# Patient Record
(demographics unavailable — no encounter records)

---

## 2025-05-18 NOTE — HISTORY OF PRESENT ILLNESS
[FreeTextEntry1] : 76 yo male with h/o CAD s/p PCI (11/2023), DM2, HTN, HLD, prostate ca, pancreatic polyps here for follow up cardiac care. Denies any significant chest discomfort, palpitations, dyspnea, LE edema, PND or syncope. Reports dyspnea with stairs and strenuous activities; stable however. Has resumed plavix since GI workup completed - likely pancreatic polyps as per patient - plan for repeat US as per GI Last stent placed in 11/2023; completed cardiac rehab. He is compliant with meds and CPAP. Was recommended for ozempic for A1C >6.5%  Has b/l hyperpigmentation over both LEs. Notes numbness in his legs and feet at night or with prolonged standing. Edema - stable in both LEs   Cardiac Workup: DSE 2/2019: nl lv sys fxn; lerma dysfxn; LVH; mild AI; no ischemia at peak heart rate achieved with dobutamine infusion. TTE 10/2023: nl lv sys fxn; nl lerma fxn; LVH; mod AS Carotid Duplex 10/2023: mild disease b/l CTA 10/2023: patent LAD stent extending to mid LAD/D2; CX/OM1 calcified; severe RCA/RPL Cardiac Cath 11/2023: patent prox LAD stent, D1 40-50%; distal LAD with disease and patent stent (iFR 0.90); 80% OM2 and mild CX; mild RCA; s/p successful PCI of OM2 TTE 07/2024: normal LV size/function. LVEF 68%, mild LVH, normal RV, LA mildly dilated, MV annulus calcified, Mod AS (MG 28, Vmax 3.3, SHAILESH 1.19, SVI 42), asc ao 3.9cm

## 2025-05-18 NOTE — PHYSICAL EXAM
[Well Developed] : well developed [Well Nourished] : well nourished [No Acute Distress] : no acute distress [Normal Conjunctiva] : normal conjunctiva [Normal Venous Pressure] : normal venous pressure [No Carotid Bruit] : no carotid bruit [Normal S1, S2] : normal S1, S2 [No Murmur] : no murmur [No Rub] : no rub [No Gallop] : no gallop [Clear Lung Fields] : clear lung fields [Good Air Entry] : good air entry [No Respiratory Distress] : no respiratory distress  [Soft] : abdomen soft [Non Tender] : non-tender [No Masses/organomegaly] : no masses/organomegaly [Normal Bowel Sounds] : normal bowel sounds [Normal Gait] : normal gait [No Cyanosis] : no cyanosis [No Clubbing] : no clubbing [Venous stasis] : venous stasis [No Rash] : no rash [No Skin Lesions] : no skin lesions [Moves all extremities] : moves all extremities [No Focal Deficits] : no focal deficits [Normal Speech] : normal speech [Alert and Oriented] : alert and oriented [Normal memory] : normal memory [de-identified] : hyperpigmentation over both LEs with trace edema over the anterior lower legs

## 2025-05-18 NOTE — ASSESSMENT
[FreeTextEntry1] : 76 yo male with h/o CAD s/p PCI, DM2, HTN, HLD, prostate ca, pancreatic polyps here for follow up cardiac care.  #ASCVD - CAD s/p PCI in 11/2023 - EKG sinus with 1st deg AVB, unchanged from prior - denies any ischemic symptoms at baseline; although has poor functional capacity - holding DAPT 5-7 days pre-procedure; rec to resume immediately post procedure - continued risk factor modification; CV risk factors discussed - continue beta blocker; may benefit from switching dilitazem to alternate anti-HTN given LE edema  #Dyspnea on exertion, LE edema - venous stasis, CEAP C4 --BNP WNL; no overt CHF --repeat TTE unchanged - normal LVEF, mod AS --continue lasix as needed -- He suffers from hyperpigmentation as a result of their lymphedema 2/2 chronic venous insufficiency. He has tried prescription compression socks at 20-30mmHg, simple exercises and elevation in the past (since 06/1/2024) without significant improvement. --will benefit from lymphedema pump --will obtain venous reflux study  --measurements for pump: LLE MIDDLE THIGH : 21.5 in , mid calf 18.5 in, ankle 11.5in . RLE MIDDLE THIGH 22.5 in, mid calf 19 in, ankle 11.75 in    # Hypertension - borderline elevated today; did not take diuretic - continue current anti-hypertensives; inc ARB, BB, and lasix - home BP monitoring encouraged - continued exercise as tolerated - Low salt diet  # Hyperlipidemia - Well controlled, Aim for a LDL of <70 - Continue current prescribed medications inc zetia and statin - Low fat low cholesterol diet - Heart healthy diet emphasized - Exercise as tolerated  Follow up 6-12 months

## 2025-06-20 NOTE — HISTORY OF PRESENT ILLNESS
[FreeTextEntry1] : 79 yo male with h/o CAD s/p PCI (11/2023), DM2, HTN, HLD, HFpEF, severe aortic stenosis, prostate ca, pancreatic polyps here for follow up cardiac care.  Had weight gain about 16 pounds, b/l LE edema and dyspnea from 5/12/24 - 6/9/25. Today reports that symptoms are improving, decreased LE edema, improving dyspnea and weight is now 307 lbs (was 316). States that he has continued to produce a lot of urine Has not used the edema pump since last visit.  Has been fluid restricting and stopped adding salt to his food Denies any significant chest discomfort, palpitations, dizziness or syncope. Denies chest pain, lightheadedness/dizziness or LOC with exertion Has resumed plavix since GI workup completed - likely pancreatic polyps as per patient - plan for repeat US as per GI Last stent placed in 11/2023; completed cardiac rehab. He is compliant with meds and CPAP. Was recommended for ozempic for A1C >6.5%   Cardiac Workup: DSE 2/2019: nl lv sys fxn; lerma dysfxn; LVH; mild AI; no ischemia at peak heart rate achieved with dobutamine infusion. TTE 10/2023: nl lv sys fxn; nl lerma fxn; LVH; mod AS Carotid Duplex 10/2023: mild disease b/l CTA 10/2023: patent LAD stent extending to mid LAD/D2; CX/OM1 calcified; severe RCA/RPL Cardiac Cath 11/2023: patent prox LAD stent, D1 40-50%; distal LAD with disease and patent stent (iFR 0.90); 80% OM2 and mild CX; mild RCA; s/p successful PCI of OM2 TTE 07/2024: normal LV size/function. LVEF 68%, mild LVH, normal RV, LA mildly dilated, MV annulus calcified, Mod AS (MG 28, Vmax 3.3, SHAILESH 1.19, SVI 42), asc ao 3.9cm DVE 12/2024: no venous insufficiency or DVT/SVT b/l TTE 6/2025: LVEF 62% normal LV size/function, mild LVH, moderate lv lerma dysfunction, normal RV, severe AS (MG52), asc aorta 4.00cm

## 2025-06-20 NOTE — PHYSICAL EXAM
[Well Developed] : well developed [Well Nourished] : well nourished [No Acute Distress] : no acute distress [Normal Conjunctiva] : normal conjunctiva [Normal Venous Pressure] : normal venous pressure [No Carotid Bruit] : no carotid bruit [Normal S1, S2] : normal S1, S2 [Murmur] : murmur [Clear Lung Fields] : clear lung fields [Good Air Entry] : good air entry [No Respiratory Distress] : no respiratory distress  [Soft] : abdomen soft [Non Tender] : non-tender [Normal Gait] : normal gait [No Cyanosis] : no cyanosis [Venous stasis] : venous stasis [No Rash] : no rash [Moves all extremities] : moves all extremities [No Focal Deficits] : no focal deficits [Normal Speech] : normal speech [Alert and Oriented] : alert and oriented [Normal memory] : normal memory [de-identified] : systolic murmur radiating to carotids [de-identified] : hyperpigmentation over both LEs with 1+ edema up to both knees

## 2025-06-20 NOTE — PHYSICAL EXAM
[Well Developed] : well developed [Well Nourished] : well nourished [No Acute Distress] : no acute distress [Normal Conjunctiva] : normal conjunctiva [Normal Venous Pressure] : normal venous pressure [No Carotid Bruit] : no carotid bruit [Normal S1, S2] : normal S1, S2 [Murmur] : murmur [Clear Lung Fields] : clear lung fields [Good Air Entry] : good air entry [No Respiratory Distress] : no respiratory distress  [Soft] : abdomen soft [Non Tender] : non-tender [Normal Gait] : normal gait [No Cyanosis] : no cyanosis [Venous stasis] : venous stasis [No Rash] : no rash [Moves all extremities] : moves all extremities [No Focal Deficits] : no focal deficits [Normal Speech] : normal speech [Alert and Oriented] : alert and oriented [Normal memory] : normal memory [de-identified] : systolic murmur radiating to carotids [de-identified] : hyperpigmentation over both LEs with 1+ edema up to both knees

## 2025-06-20 NOTE — ASSESSMENT
[FreeTextEntry1] : 79 yo male with h/o CAD s/p PCI, DM2, HTN, HLD, HFpEF, severe aortic stenosis, prostate ca, pancreatic polyps here for follow up cardiac care.  #ASCVD - CAD s/p PCI in 11/2023 - EKG sinus with 1st deg AVB, unchanged from prior - denies any ischemic symptoms at baseline; although has poor functional capacity - continued risk factor modification; CV risk factors discussed - continue beta blocker  #HFpEF - improving weight, improving edema and dyspnea, recent mild acute on chronic decompensation --BNP WNL in the past treated with edema pump --TTE with normal LVEF, now severe AS - will refer to [facility] for further eval - 8/1/25 --continue bumex 2mg QD --continue spironolactone 25mg --continue Telmisartan 80mg --no significant venous insufficiency b/l --salt/fluid restriction strongly emphasized  #Moderate-severe aortic stenosis --will refer to [facility] given CHF decompensation  #Hypertension - BP WNL today - continue current anti-hypertensives; inc ARB, BB, MRA, and bumex as above - home BP monitoring encouraged - continued exercise as tolerated - Low salt diet --3 week BP/Lab check  # Hyperlipidemia - Well controlled LDL 52, Aim for an LDL of <70 - Continue current prescribed medications inc zetia and statin - Low fat low cholesterol diet - Heart healthy diet emphasized - Exercise as tolerated  3 week follow up for BP/lab/volume check and then follow up with Dr. Fontaine on 9/15/25

## 2025-06-20 NOTE — ASSESSMENT
[FreeTextEntry1] : 77 yo male with h/o CAD s/p PCI, DM2, HTN, HLD, HFpEF, severe aortic stenosis, prostate ca, pancreatic polyps here for follow up cardiac care.  #ASCVD - CAD s/p PCI in 11/2023 - EKG sinus with 1st deg AVB, unchanged from prior - denies any ischemic symptoms at baseline; although has poor functional capacity - continued risk factor modification; CV risk factors discussed - continue beta blocker  #HFpEF - improving weight, improving edema and dyspnea, recent mild acute on chronic decompensation --BNP WNL in the past treated with edema pump --TTE with normal LVEF, now severe AS - will refer to [facility] for further eval - 8/1/25 --continue bumex 2mg QD --continue spironolactone 25mg --continue Telmisartan 80mg --no significant venous insufficiency b/l --salt/fluid restriction strongly emphasized  #Moderate-severe aortic stenosis --will refer to [facility] given CHF decompensation  #Hypertension - BP WNL today - continue current anti-hypertensives; inc ARB, BB, MRA, and bumex as above - home BP monitoring encouraged - continued exercise as tolerated - Low salt diet --3 week BP/Lab check  # Hyperlipidemia - Well controlled LDL 52, Aim for an LDL of <70 - Continue current prescribed medications inc zetia and statin - Low fat low cholesterol diet - Heart healthy diet emphasized - Exercise as tolerated  3 week follow up for BP/lab/volume check and then follow up with Dr. Fontaine on 9/15/25

## 2025-06-22 NOTE — PHYSICAL EXAM
[Well Developed] : well developed [Well Nourished] : well nourished [No Acute Distress] : no acute distress [Normal Conjunctiva] : normal conjunctiva [Normal Venous Pressure] : normal venous pressure [No Carotid Bruit] : no carotid bruit [Normal S1, S2] : normal S1, S2 [Murmur] : murmur [Clear Lung Fields] : clear lung fields [Good Air Entry] : good air entry [No Respiratory Distress] : no respiratory distress  [Soft] : abdomen soft [Non Tender] : non-tender [Normal Gait] : normal gait [No Cyanosis] : no cyanosis [Venous stasis] : venous stasis [No Rash] : no rash [Moves all extremities] : moves all extremities [No Focal Deficits] : no focal deficits [Normal Speech] : normal speech [Alert and Oriented] : alert and oriented [Normal memory] : normal memory [de-identified] : systolic murmur radiating to carotids [de-identified] : hyperpigmentation over both LEs with 1+ edema up to both knees

## 2025-07-20 NOTE — PHYSICAL EXAM
[Well Developed] : well developed [Well Nourished] : well nourished [No Acute Distress] : no acute distress [Normal Conjunctiva] : normal conjunctiva [Normal Venous Pressure] : normal venous pressure [No Carotid Bruit] : no carotid bruit [Normal S1, S2] : normal S1, S2 [Murmur] : murmur [Clear Lung Fields] : clear lung fields [Good Air Entry] : good air entry [No Respiratory Distress] : no respiratory distress  [Soft] : abdomen soft [Non Tender] : non-tender [Normal Gait] : normal gait [No Cyanosis] : no cyanosis [Venous stasis] : venous stasis [No Rash] : no rash [Moves all extremities] : moves all extremities [No Focal Deficits] : no focal deficits [Normal Speech] : normal speech [Alert and Oriented] : alert and oriented [Normal memory] : normal memory [de-identified] : systolic murmur radiating to carotids [de-identified] : hyperpigmentation over both LEs with 1+ edema up to both knees

## 2025-07-20 NOTE — ASSESSMENT
[FreeTextEntry1] : 79 yo male with h/o CAD s/p PCI, DM2, HTN, HLD, HFpEF, severe aortic stenosis, prostate ca, pancreatic polyps here for follow up cardiac care.  #ASCVD - CAD s/p PCI in 11/2023 - EKG sinus with 1st deg AVB, unchanged from prior - denies any ischemic symptoms at baseline; although has poor functional capacity - continued risk factor modification; CV risk factors discussed - continue beta blocker  #HFpEF - improving weight, improving edema and dyspnea, recent mild acute on chronic decompensation --BNP WNL in the past treated with edema pump and diuresis --TTE with normal LVEF, now severe AS - will refer to [facility] for further eval - 8/1/25 --increase bumex 2mg QAM. Recommended to increase to BID if weight increases >5 lbs in 1 week --continue spironolactone 25mg --continue Telmisartan 80mg --no significant venous insufficiency b/l --salt/fluid restriction strongly emphasized  #Severe aortic stenosis, Vmax >4, MG >40 --will refer to [facility] given CHF decompensation  #Hypertension - BP elevated today - continue current anti-hypertensives; inc ARB, BB, MRA, and bumex as above - home BP monitoring encouraged - continued exercise as tolerated - Low salt diet  # Hyperlipidemia - Well controlled LDL 52, Aim for an LDL of <70 - Continue current prescribed medications inc zetia and statin - Low fat low cholesterol diet - Heart healthy diet emphasized - Exercise as tolerated  Follow up in 3 months

## 2025-07-20 NOTE — ASSESSMENT
[FreeTextEntry1] : 77 yo male with h/o CAD s/p PCI, DM2, HTN, HLD, HFpEF, severe aortic stenosis, prostate ca, pancreatic polyps here for follow up cardiac care.  #ASCVD - CAD s/p PCI in 11/2023 - EKG sinus with 1st deg AVB, unchanged from prior - denies any ischemic symptoms at baseline; although has poor functional capacity - continued risk factor modification; CV risk factors discussed - continue beta blocker  #HFpEF - improving weight, improving edema and dyspnea, recent mild acute on chronic decompensation --BNP WNL in the past treated with edema pump and diuresis --TTE with normal LVEF, now severe AS - will refer to [facility] for further eval - 8/1/25 --increase bumex 2mg QAM. Recommended to increase to BID if weight increases >5 lbs in 1 week --continue spironolactone 25mg --continue Telmisartan 80mg --no significant venous insufficiency b/l --salt/fluid restriction strongly emphasized  #Severe aortic stenosis, Vmax >4, MG >40 --will refer to [facility] given CHF decompensation  #Hypertension - BP elevated today - continue current anti-hypertensives; inc ARB, BB, MRA, and bumex as above - home BP monitoring encouraged - continued exercise as tolerated - Low salt diet  # Hyperlipidemia - Well controlled LDL 52, Aim for an LDL of <70 - Continue current prescribed medications inc zetia and statin - Low fat low cholesterol diet - Heart healthy diet emphasized - Exercise as tolerated  Follow up in 3 months

## 2025-07-20 NOTE — HISTORY OF PRESENT ILLNESS
[FreeTextEntry1] : 79 yo male with h/o CAD s/p PCI (11/2023), DM2, HTN, HLD, HFpEF, severe aortic stenosis (vmax 4.4, MG52), HENRI on CPAP, prostate ca, pancreatic polyps here for follow up cardiac care.  Had weight gain about 16 pounds, b/l LE edema and dyspnea from 5/12/24 - 6/9/25. Today reports that symptoms are improving, decreased LE edema, improving dyspnea and weight is now 303 lbs (was 316). Continues to diurese. Has been fluid restricting and stopped adding salt to his food. Improved on bumex, now on 2mg daily.  Has resumed plavix since GI workup completed - likely pancreatic polyps as per patient - plan for repeat US as per GI. Was recommended for ozempic for A1C >6.5%   Here for follow up. Continues to feel short of breath with minimal exertion. His weight is stable. 2 pillow orthopnea, and improving LE edema. Denies any dizziness, chest discomfort or syncope. Has not seen structural heart team yet - appt pending in August    Cardiac Workup: DSE 2/2019: nl lv sys fxn; lerma dysfxn; LVH; mild AI; no ischemia at peak heart rate achieved with dobutamine infusion. TTE 10/2023: nl lv sys fxn; nl lerma fxn; LVH; mod AS Carotid Duplex 10/2023: mild disease b/l CTA 10/2023: patent LAD stent extending to mid LAD/D2; CX/OM1 calcified; severe RCA/RPL Cardiac Cath 11/2023: patent prox LAD stent, D1 40-50%; distal LAD with disease and patent stent (iFR 0.90); 80% OM2 and mild CX; mild RCA; s/p successful PCI of OM2 TTE 07/2024: normal LV size/function. LVEF 68%, mild LVH, normal RV, LA mildly dilated, MV annulus calcified, Mod AS (MG 28, Vmax 3.3, SHAILESH 1.19, SVI 42), asc ao 3.9cm DVE 12/2024: no venous insufficiency or DVT/SVT b/l TTE 6/2025: LVEF 62% normal LV size/function, mild LVH, moderate lv lerma dysfunction, normal RV, severe AS (vmax 4.4, MG52), asc aorta 4.00cm

## 2025-07-20 NOTE — PHYSICAL EXAM
[Well Developed] : well developed [Well Nourished] : well nourished [No Acute Distress] : no acute distress [Normal Conjunctiva] : normal conjunctiva [Normal Venous Pressure] : normal venous pressure [No Carotid Bruit] : no carotid bruit [Normal S1, S2] : normal S1, S2 [Murmur] : murmur [Clear Lung Fields] : clear lung fields [Good Air Entry] : good air entry [No Respiratory Distress] : no respiratory distress  [Soft] : abdomen soft [Non Tender] : non-tender [Normal Gait] : normal gait [No Cyanosis] : no cyanosis [Venous stasis] : venous stasis [No Rash] : no rash [Moves all extremities] : moves all extremities [No Focal Deficits] : no focal deficits [Normal Speech] : normal speech [Alert and Oriented] : alert and oriented [Normal memory] : normal memory [de-identified] : systolic murmur radiating to carotids [de-identified] : hyperpigmentation over both LEs with 1+ edema up to both knees